# Patient Record
Sex: MALE | Race: BLACK OR AFRICAN AMERICAN | NOT HISPANIC OR LATINO | URBAN - METROPOLITAN AREA
[De-identification: names, ages, dates, MRNs, and addresses within clinical notes are randomized per-mention and may not be internally consistent; named-entity substitution may affect disease eponyms.]

---

## 2018-02-19 ENCOUNTER — EMERGENCY (EMERGENCY)
Facility: HOSPITAL | Age: 53
LOS: 1 days | Discharge: HOME | End: 2018-02-19
Admitting: EMERGENCY MEDICINE
Payer: MEDICAID

## 2018-02-19 VITALS
OXYGEN SATURATION: 100 % | TEMPERATURE: 99 F | HEART RATE: 66 BPM | SYSTOLIC BLOOD PRESSURE: 127 MMHG | RESPIRATION RATE: 16 BRPM | DIASTOLIC BLOOD PRESSURE: 71 MMHG

## 2018-02-19 PROCEDURE — 72100 X-RAY EXAM L-S SPINE 2/3 VWS: CPT | Mod: 26

## 2018-02-19 PROCEDURE — 99284 EMERGENCY DEPT VISIT MOD MDM: CPT

## 2018-02-19 RX ORDER — IBUPROFEN 200 MG
1 TABLET ORAL
Qty: 15 | Refills: 0 | OUTPATIENT
Start: 2018-02-19 | End: 2018-02-23

## 2018-02-19 RX ORDER — KETOROLAC TROMETHAMINE 30 MG/ML
30 SYRINGE (ML) INJECTION ONCE
Qty: 0 | Refills: 0 | Status: DISCONTINUED | OUTPATIENT
Start: 2018-02-19 | End: 2018-02-19

## 2018-02-19 RX ORDER — OXYCODONE AND ACETAMINOPHEN 5; 325 MG/1; MG/1
1 TABLET ORAL ONCE
Qty: 0 | Refills: 0 | Status: DISCONTINUED | OUTPATIENT
Start: 2018-02-19 | End: 2018-02-19

## 2018-02-19 RX ORDER — DIAZEPAM 5 MG
1 TABLET ORAL
Qty: 4 | Refills: 0 | OUTPATIENT
Start: 2018-02-19 | End: 2018-02-20

## 2018-02-19 RX ADMIN — OXYCODONE AND ACETAMINOPHEN 1 TABLET(S): 5; 325 TABLET ORAL at 19:46

## 2018-02-19 RX ADMIN — Medication 30 MILLIGRAM(S): at 19:45

## 2018-02-19 RX ADMIN — Medication 30 MILLIGRAM(S): at 18:45

## 2018-02-19 NOTE — ED PROVIDER NOTE - PLAN OF CARE
Follow up with your primary physciian for a post hosptial visit wtihin 48 hours, taking all results from the ER to be reviewed. Warm comrpesses as directed. For pain control you can take motrin 600mg 1 tab every 8 hours if needed. If needed for muslce spasm take valium 5 mg , 1 twice a day. Don't drive with the valium as it can cause drowsiness.   Set up a follow up to be seen by an orthopedist, names and numbers provided. If any weakness in the extremities, worsening pains, sensation changes ot the extremities, loss of urine or bowel function, worsening, concerning or new sings or symptoms return to the ER Follow up with your primary physician for a post hospital visit within 48 hours, taking all results from the ER to be reviewed. Warm compresses as directed. For pain control you can take Motrin 600mg 1 tab every 8 hours if needed. If needed for muscle spasm take valium 5 mg , 1 twice a day. Don't drive with the valium as it can cause drowsiness. If needed for break through pain take percocet, 1 tab twice a day. Don't drive with the percocet as it can cause drowsiness.    Set up a follow up to be seen by an orthopedist, names and numbers provided. If any weakness in the extremities, worsening pains, sensation changes ot the extremities, loss of urine or bowel function, foot drop,  worsening, concerning or new sings or symptoms return to the ER

## 2018-02-19 NOTE — ED PROVIDER NOTE - CARE PLAN
Principal Discharge DX:	Back pain  Assessment and plan of treatment:	Follow up with your primary physciian for a post hosptial visit wtihin 48 hours, taking all results from the ER to be reviewed. Warm comrpesses as directed. For pain control you can take motrin 600mg 1 tab every 8 hours if needed. If needed for muslce spasm take valium 5 mg , 1 twice a day. Don't drive with the valium as it can cause drowsiness.   Set up a follow up to be seen by an orthopedist, names and numbers provided. If any weakness in the extremities, worsening pains, sensation changes ot the extremities, loss of urine or bowel function, worsening, concerning or new sings or symptoms return to the ER Principal Discharge DX:	Back pain  Assessment and plan of treatment:	Follow up with your primary physician for a post hospital visit within 48 hours, taking all results from the ER to be reviewed. Warm compresses as directed. For pain control you can take Motrin 600mg 1 tab every 8 hours if needed. If needed for muscle spasm take valium 5 mg , 1 twice a day. Don't drive with the valium as it can cause drowsiness. If needed for break through pain take percocet, 1 tab twice a day. Don't drive with the percocet as it can cause drowsiness.    Set up a follow up to be seen by an orthopedist, names and numbers provided. If any weakness in the extremities, worsening pains, sensation changes ot the extremities, loss of urine or bowel function, foot drop,  worsening, concerning or new sings or symptoms return to the ER

## 2018-02-19 NOTE — ED ADULT TRIAGE NOTE - CHIEF COMPLAINT QUOTE
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

## 2018-02-19 NOTE — ED PROVIDER NOTE - PROGRESS NOTE DETAILS
x-ray, no compression fracture  Improvement with meds, no neuro deficits Able to ambulate without difficulty.

## 2018-02-19 NOTE — ED PROVIDER NOTE - OBJECTIVE STATEMENT
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 Pt states he was bending down to mop the floors and when he stood up he felt pain in the lower back.   Denies any sensation changes to the lower extremities, weakness to the lower extremities, loss of urine or bowel function.  States he has had issues with his lower back from strenuous work in the past, had xray in past which was ng.   No deficits, just occasional pain randomly thru years.    Able to ambulate at this time without assistance.  Movement exaccerbates the pain, resting relieves the discomfort.
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
## 2021-06-22 NOTE — ED ADULT TRIAGE NOTE - NS ED TRIAGE AVPU SCALE
Alert-The patient is alert, awake and responds to voice. The patient is oriented to time, place, and person. The triage nurse is able to obtain subjective information.
none known